# Patient Record
Sex: FEMALE | URBAN - METROPOLITAN AREA
[De-identification: names, ages, dates, MRNs, and addresses within clinical notes are randomized per-mention and may not be internally consistent; named-entity substitution may affect disease eponyms.]

---

## 2023-04-11 ENCOUNTER — APPOINTMENT (OUTPATIENT)
Dept: URBAN - METROPOLITAN AREA CLINIC 233 | Age: 68
Setting detail: DERMATOLOGY
End: 2023-04-11

## 2023-04-11 DIAGNOSIS — L98429 CHRONIC ULCER OF OTHER SPECIFIED SITES: ICD-10-CM

## 2023-04-11 DIAGNOSIS — L98419 CHRONIC ULCER OF OTHER SPECIFIED SITES: ICD-10-CM

## 2023-04-11 PROBLEM — L97.821 NON-PRESSURE CHRONIC ULCER OF OTHER PART OF LEFT LOWER LEG LIMITED TO BREAKDOWN OF SKIN: Status: ACTIVE | Noted: 2023-04-11

## 2023-04-11 PROBLEM — L97.811 NON-PRESSURE CHRONIC ULCER OF OTHER PART OF RIGHT LOWER LEG LIMITED TO BREAKDOWN OF SKIN: Status: ACTIVE | Noted: 2023-04-11

## 2023-04-11 PROCEDURE — OTHER PRESCRIPTION: OTHER

## 2023-04-11 PROCEDURE — OTHER TREATMENT REGIMEN: OTHER

## 2023-04-11 PROCEDURE — 99204 OFFICE O/P NEW MOD 45 MIN: CPT

## 2023-04-11 RX ORDER — CLOBETASOL PROPIONATE 0.5 MG/G
CREAM TOPICAL
Qty: 60 | Refills: 2 | Status: ERX | COMMUNITY
Start: 2023-04-11

## 2023-04-11 ASSESSMENT — LOCATION DETAILED DESCRIPTION DERM
LOCATION DETAILED: RIGHT DISTAL PRETIBIAL REGION
LOCATION DETAILED: LEFT PROXIMAL PRETIBIAL REGION
LOCATION DETAILED: LEFT MEDIAL PROXIMAL PRETIBIAL REGION

## 2023-04-11 ASSESSMENT — LOCATION ZONE DERM: LOCATION ZONE: LEG

## 2023-04-11 ASSESSMENT — LOCATION SIMPLE DESCRIPTION DERM
LOCATION SIMPLE: LEFT PRETIBIAL REGION
LOCATION SIMPLE: RIGHT PRETIBIAL REGION

## 2023-04-11 NOTE — PROCEDURE: TREATMENT REGIMEN
Initiate Treatment: Clobetasol topical
Detail Level: Zone
Plan: STOP debridement for now \\nConsider hyperbaric oxygen as these ulcers could be diabetes related \\nConsider pentoxifylline if minimal improvement with above treatment  \\nConsider topical dapsone if minimal improvement or side effects from clobetasol \\nOk to continue topical lidocaine and topical honey

## 2023-05-02 ENCOUNTER — APPOINTMENT (OUTPATIENT)
Dept: URBAN - METROPOLITAN AREA CLINIC 233 | Age: 68
Setting detail: DERMATOLOGY
End: 2023-05-02

## 2023-05-02 DIAGNOSIS — L98419 CHRONIC ULCER OF OTHER SPECIFIED SITES: ICD-10-CM

## 2023-05-02 DIAGNOSIS — L259 CONTACT DERMATITIS AND OTHER ECZEMA, UNSPECIFIED CAUSE: ICD-10-CM

## 2023-05-02 DIAGNOSIS — L98429 CHRONIC ULCER OF OTHER SPECIFIED SITES: ICD-10-CM

## 2023-05-02 PROBLEM — L97.811 NON-PRESSURE CHRONIC ULCER OF OTHER PART OF RIGHT LOWER LEG LIMITED TO BREAKDOWN OF SKIN: Status: ACTIVE | Noted: 2023-05-02

## 2023-05-02 PROBLEM — L23.9 ALLERGIC CONTACT DERMATITIS, UNSPECIFIED CAUSE: Status: ACTIVE | Noted: 2023-05-02

## 2023-05-02 PROBLEM — L97.821 NON-PRESSURE CHRONIC ULCER OF OTHER PART OF LEFT LOWER LEG LIMITED TO BREAKDOWN OF SKIN: Status: ACTIVE | Noted: 2023-05-02

## 2023-05-02 PROCEDURE — OTHER ADDITIONAL NOTES: OTHER

## 2023-05-02 PROCEDURE — OTHER TREATMENT REGIMEN: OTHER

## 2023-05-02 PROCEDURE — 99214 OFFICE O/P EST MOD 30 MIN: CPT

## 2023-05-02 PROCEDURE — OTHER PRESCRIPTION: OTHER

## 2023-05-02 PROCEDURE — OTHER COUNSELING: OTHER

## 2023-05-02 RX ORDER — CLOBETASOL PROPIONATE 0.5 MG/G
CREAM TOPICAL
Qty: 60 | Refills: 2 | Status: ERX

## 2023-05-02 RX ORDER — TRIAMCINOLONE ACETONIDE 1 MG/G
CREAM TOPICAL BID
Qty: 80 | Refills: 0 | Status: ERX | COMMUNITY
Start: 2023-05-02

## 2023-05-02 ASSESSMENT — LOCATION SIMPLE DESCRIPTION DERM
LOCATION SIMPLE: RIGHT PRETIBIAL REGION
LOCATION SIMPLE: LEFT PRETIBIAL REGION

## 2023-05-02 ASSESSMENT — LOCATION ZONE DERM: LOCATION ZONE: LEG

## 2023-05-02 ASSESSMENT — LOCATION DETAILED DESCRIPTION DERM
LOCATION DETAILED: LEFT PROXIMAL PRETIBIAL REGION
LOCATION DETAILED: RIGHT DISTAL PRETIBIAL REGION
LOCATION DETAILED: LEFT MEDIAL PROXIMAL PRETIBIAL REGION

## 2023-05-02 NOTE — PROCEDURE: ADDITIONAL NOTES
Detail Level: Simple
Additional Notes: Left lateral 3.7 X 2.2 left medial 3 X 1.6.  Right lateral 4.5 X 2.5
Render Risk Assessment In Note?: no
Additional Notes: Patiehnt seeing vascular and wound clinic. States that vascular plans Doppler US this week. Agree with plan for US. \\n\\nDiscussed PO pentoxyfille,  but advised patient get clearance from kidney doctor, vascular, and wound care

## 2023-06-15 ENCOUNTER — APPOINTMENT (OUTPATIENT)
Dept: URBAN - METROPOLITAN AREA CLINIC 233 | Age: 68
Setting detail: DERMATOLOGY
End: 2023-06-15

## 2023-06-15 DIAGNOSIS — L98419 CHRONIC ULCER OF OTHER SPECIFIED SITES: ICD-10-CM

## 2023-06-15 DIAGNOSIS — L98429 CHRONIC ULCER OF OTHER SPECIFIED SITES: ICD-10-CM

## 2023-06-15 PROBLEM — L97.821 NON-PRESSURE CHRONIC ULCER OF OTHER PART OF LEFT LOWER LEG LIMITED TO BREAKDOWN OF SKIN: Status: ACTIVE | Noted: 2023-06-15

## 2023-06-15 PROBLEM — L97.811 NON-PRESSURE CHRONIC ULCER OF OTHER PART OF RIGHT LOWER LEG LIMITED TO BREAKDOWN OF SKIN: Status: ACTIVE | Noted: 2023-06-15

## 2023-06-15 PROCEDURE — OTHER TREATMENT REGIMEN: OTHER

## 2023-06-15 PROCEDURE — OTHER PRESCRIPTION: OTHER

## 2023-06-15 PROCEDURE — 99214 OFFICE O/P EST MOD 30 MIN: CPT

## 2023-06-15 PROCEDURE — OTHER ADDITIONAL NOTES: OTHER

## 2023-06-15 RX ORDER — CLOBETASOL PROPIONATE 0.5 MG/G
CREAM TOPICAL
Qty: 60 | Refills: 2 | Status: ERX

## 2023-06-15 ASSESSMENT — LOCATION SIMPLE DESCRIPTION DERM
LOCATION SIMPLE: LEFT PRETIBIAL REGION
LOCATION SIMPLE: RIGHT PRETIBIAL REGION

## 2023-06-15 ASSESSMENT — LOCATION ZONE DERM: LOCATION ZONE: LEG

## 2023-06-15 NOTE — PROCEDURE: TREATMENT REGIMEN
Initiate Treatment: Clobetasol topical
Detail Level: Zone
Plan: Consider hyperbaric oxygen as these ulcers could be diabetes related \\nConsider pentoxifylline if minimal improvement with above treatment  \\nConsider topical dapsone if minimal improvement or side effects from clobetasol  (cost prohibitive) \\nOk to continue topical lidocaine and topical honey

## 2023-06-15 NOTE — PROCEDURE: ADDITIONAL NOTES
Additional Notes: Patiehnt seeing vascular and wound clinic. States that vascular plans Doppler US this week. Agree with plan for US. \\n\\nDiscussed PO pentoxyfille,  but advised patient get clearance from kidney doctor, vascular, and wound care\\n\\nNephrologist is Dr. Gume Lora, office phone: [phone] Additional Notes: Patiehnt seeing vascular and wound clinic. States that vascular plans Doppler US this week. Agree with plan for US. \\n\\nDiscussed PO pentoxyfille,  but advised patient get clearance from kidney doctor, vascular, and wound care\\n\\nNephrologist is Dr. Gume Lora, office phone: [phone]

## 2023-06-15 NOTE — PROCEDURE: ADDITIONAL NOTES
Additional Notes: 5/7/23Left lateral 3.7 X 2.2 left medial 3 X 1.6.  Right lateral 4.5 X 2.5\\n\\n6/15/23 left lateral 3.7 X 1.8 left medial 2.8 X 1.1\\n\\nDr Librado,  vascular surgeon, performing deep debridement on both legs on 6/20/23